# Patient Record
Sex: MALE | Race: WHITE | Employment: FULL TIME | URBAN - METROPOLITAN AREA
[De-identification: names, ages, dates, MRNs, and addresses within clinical notes are randomized per-mention and may not be internally consistent; named-entity substitution may affect disease eponyms.]

---

## 2024-11-02 ENCOUNTER — HOSPITAL ENCOUNTER (OUTPATIENT)
Age: 54
Discharge: HOME | End: 2024-11-02
Payer: COMMERCIAL

## 2024-11-02 VITALS
TEMPERATURE: 97 F | OXYGEN SATURATION: 99 % | SYSTOLIC BLOOD PRESSURE: 138 MMHG | RESPIRATION RATE: 20 BRPM | DIASTOLIC BLOOD PRESSURE: 79 MMHG | HEART RATE: 62 BPM

## 2024-11-02 DIAGNOSIS — J06.9 VIRAL URI: Primary | ICD-10-CM

## 2024-11-02 LAB — S PYO AG THROAT QL: NEGATIVE

## 2024-11-02 NOTE — ED INITIAL ASSESSMENT (HPI)
Patient with 6 days of cough and sinus pressure   + body aches  Left ear pain when swallowing   Taking Advil and tylenol at home

## 2024-11-02 NOTE — DISCHARGE INSTRUCTIONS
Flonase nasal spray, 2 sprays in each nostril daily for 2 weeks  Robinson Sinus rinse, available over the counter, do this 2-3 times per day  Push fluids  Steam showers  If you develop facial swelling, chest pain, shortness of breath or any new/worsening symptoms, return or go to the emergency room  If no improvement in 1 week, please see your primary care provider   Augmentin 1 tablet twice per day for 7 days, wait to see if symptoms improve within the next 3 days, if not, start Augmentin

## 2024-11-02 NOTE — ED PROVIDER NOTES
Chief Complaint   Patient presents with    Cough/URI    Nasal Congestion       HPI:     Shine Su is a 54 year old male who presents for evaluation and management of a chief complaint of cough, sinus pressure, body aches, throat pain, left ear pain and swelling, ongoing for 6 days.  No fever.  No chest pain or shortness of breath.  Nausea, vomiting, diarrhea, abdominal pain.    PFSH  PFSH asessment screens reviewed and agree.  Nursing note reviewed and I agree with documentation.    No family history on file.  Family history reviewed with patient/caregiver and is not pertinent to presenting problem.  Social History     Socioeconomic History    Marital status:      Spouse name: Not on file    Number of children: Not on file    Years of education: Not on file    Highest education level: Not on file   Occupational History    Not on file   Tobacco Use    Smoking status: Not on file    Smokeless tobacco: Not on file   Substance and Sexual Activity    Alcohol use: Not on file    Drug use: Not on file    Sexual activity: Not on file   Other Topics Concern    Not on file   Social History Narrative    Not on file     Social Drivers of Health     Financial Resource Strain: Not on file   Food Insecurity: Not on file   Transportation Needs: Not on file   Physical Activity: Not on file   Stress: Not on file   Social Connections: Not on file   Housing Stability: Not on file        Findings:    /79   Pulse 62   Temp 97.4 °F (36.3 °C) (Temporal)   Resp 20   SpO2 99%   GENERAL: well developed, well nourished, well hydrated, no distress  HEAD: normocephalic  NECK: supple, no adenopathy  EYES: sclera non icteric bilateral, conjunctiva clear  EARS: TM  bilateral: normal  NOSE: nasal turbinates: swollen, red, and clear drainage  THROAT: redness noted.  No tonsil hypertrophy, uvula midline.  CARDIO: RRR without murmur  LUNGS: clear to auscultation bilaterally; no rales, rhonchi, or wheezes  SKIN: good skin  turgor, no obvious rashes    MDM/Assessment/Plan:   Orders for this encounter:  Orders Placed This Encounter    POCT Rapid Strep    POCT Rapid Strep    amoxicillin clavulanate 875-125 MG Oral Tab     Sig: Take 1 tablet by mouth 2 (two) times daily for 7 days.     Dispense:  14 tablet     Refill:  0       Labs performed this visit:  Recent Results (from the past 10 hours)   POCT Rapid Strep    Collection Time: 11/02/24 12:27 PM   Result Value Ref Range    POCT Rapid Strep Negative Negative       MDM:   Medical Decision Making  Differentials considered: Viral URI versus strep versus sinusitis versus pneumonia versus other    HPI and exam consistent with viral URI.  Patient is healthy appearing, normal vitals, lungs clear, low suspicion for pneumonia.  Strep test is negative.  Patient was given watch and wait antibiotic, he was advised to wait 3 days, and if no improvement start Augmentin.  Otherwise supportive care was discussed.  Advise follow-up with primary care provider in 1 week if no improvement.  Patient verbalized understanding and agreeable to plan of care.    Amount and/or Complexity of Data Reviewed  Labs: ordered. Decision-making details documented in ED Course.     Details: Strep negative         Diagnosis:    ICD-10-CM    1. Viral URI  J06.9           All results reviewed and discussed with patient.  See AVS for detailed discharge instructions for your condition today.    Follow Up with:  No follow-up provider specified.